# Patient Record
Sex: FEMALE | Race: WHITE
[De-identification: names, ages, dates, MRNs, and addresses within clinical notes are randomized per-mention and may not be internally consistent; named-entity substitution may affect disease eponyms.]

---

## 2020-05-21 ENCOUNTER — HOSPITAL ENCOUNTER (INPATIENT)
Dept: HOSPITAL 62 - ER | Age: 65
LOS: 4 days | Discharge: HOME | DRG: 871 | End: 2020-05-25
Attending: INTERNAL MEDICINE | Admitting: INTERNAL MEDICINE
Payer: COMMERCIAL

## 2020-05-21 DIAGNOSIS — R65.21: ICD-10-CM

## 2020-05-21 DIAGNOSIS — F31.9: ICD-10-CM

## 2020-05-21 DIAGNOSIS — R47.81: ICD-10-CM

## 2020-05-21 DIAGNOSIS — G93.41: ICD-10-CM

## 2020-05-21 DIAGNOSIS — N18.9: ICD-10-CM

## 2020-05-21 DIAGNOSIS — A41.51: Primary | ICD-10-CM

## 2020-05-21 DIAGNOSIS — Z88.6: ICD-10-CM

## 2020-05-21 DIAGNOSIS — Z03.818: ICD-10-CM
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
## 2020-05-21 DIAGNOSIS — J44.9: ICD-10-CM

## 2020-05-21 DIAGNOSIS — Z88.8: ICD-10-CM

## 2020-05-21 DIAGNOSIS — I12.9: ICD-10-CM

## 2020-05-21 DIAGNOSIS — N17.9: ICD-10-CM

## 2020-05-21 DIAGNOSIS — Z91.048: ICD-10-CM

## 2020-05-21 DIAGNOSIS — E11.40: ICD-10-CM

## 2020-05-21 DIAGNOSIS — E66.9: ICD-10-CM

## 2020-05-21 DIAGNOSIS — D72.810: ICD-10-CM

## 2020-05-21 DIAGNOSIS — Z79.899: ICD-10-CM
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
## 2020-05-21 DIAGNOSIS — N39.0: ICD-10-CM

## 2020-05-21 DIAGNOSIS — Z79.891: ICD-10-CM

## 2020-05-21 LAB
ABSOLUTE LYMPHOCYTES# (MANUAL): 1.6 10^3/UL (ref 0.5–4.7)
ABSOLUTE MONOCYTES # (MANUAL): 1.2 10^3/UL (ref 0.1–1.4)
ADD MANUAL DIFF: YES
ALBUMIN SERPL-MCNC: 4 G/DL (ref 3.5–5)
ALP SERPL-CCNC: 109 U/L (ref 38–126)
ANION GAP SERPL CALC-SCNC: 11 MMOL/L (ref 5–19)
APPEARANCE UR: (no result)
APTT PPP: YELLOW S
ARTERIAL BLOOD FIO2: (no result)
ARTERIAL BLOOD FIO2: (no result)
ARTERIAL BLOOD H2CO3: 1.14 MMOL/L (ref 1.05–1.35)
ARTERIAL BLOOD H2CO3: 1.47 MMOL/L (ref 1.05–1.35)
ARTERIAL BLOOD HCO3: 20.7 MMOL/L (ref 20–24)
ARTERIAL BLOOD HCO3: 25.4 MMOL/L (ref 20–24)
ARTERIAL BLOOD PCO2: 37.9 MMHG (ref 35–45)
ARTERIAL BLOOD PCO2: 48.9 MMHG (ref 35–45)
ARTERIAL BLOOD PH: 7.33 (ref 7.35–7.45)
ARTERIAL BLOOD PH: 7.36 (ref 7.35–7.45)
ARTERIAL BLOOD PO2: 18.7 MMHG (ref 80–100)
ARTERIAL BLOOD PO2: 64.3 MMHG (ref 80–100)
ARTERIAL BLOOD TOTAL CO2: 21.9 MMOL/L (ref 21–25)
ARTERIAL BLOOD TOTAL CO2: 26.9 MMOL/L (ref 21–25)
AST SERPL-CCNC: 149 U/L (ref 14–36)
BARBITURATES UR QL SCN: NEGATIVE
BASE EXCESS BLDA CALC-SCNC: -0.9 MMOL/L
BASE EXCESS BLDA CALC-SCNC: -4.3 MMOL/L
BASOPHILS NFR BLD MANUAL: 0 % (ref 0–2)
BILIRUB DIRECT SERPL-MCNC: 0.2 MG/DL (ref 0–0.4)
BILIRUB SERPL-MCNC: 0.8 MG/DL (ref 0.2–1.3)
BILIRUB UR QL STRIP: NEGATIVE
BUN SERPL-MCNC: 52 MG/DL (ref 7–20)
CALCIUM: 9.6 MG/DL (ref 8.4–10.2)
CHLORIDE SERPL-SCNC: 102 MMOL/L (ref 98–107)
CO2 SERPL-SCNC: 22 MMOL/L (ref 22–30)
EOSINOPHIL NFR BLD MANUAL: 0 % (ref 0–6)
ERYTHROCYTE [DISTWIDTH] IN BLOOD BY AUTOMATED COUNT: 13.6 % (ref 11.5–14)
ETHANOL SERPL-MCNC: < 10 MG/DL
GLUCOSE SERPL-MCNC: 160 MG/DL (ref 75–110)
GLUCOSE UR STRIP-MCNC: NEGATIVE MG/DL
HCT VFR BLD CALC: 37.9 % (ref 36–47)
HGB BLD-MCNC: 12.6 G/DL (ref 12–15.5)
KETONES UR STRIP-MCNC: NEGATIVE MG/DL
MACROCYTES BLD QL SMEAR: (no result)
MCH RBC QN AUTO: 33.4 PG (ref 27–33.4)
MCHC RBC AUTO-ENTMCNC: 33.4 G/DL (ref 32–36)
MCV RBC AUTO: 100 FL (ref 80–97)
METHADONE UR QL SCN: NEGATIVE
MONOCYTES % (MANUAL): 6 % (ref 3–13)
NEUTS BAND NFR BLD MANUAL: 5 % (ref 3–5)
NITRITE UR QL STRIP: POSITIVE
PCP UR QL SCN: NEGATIVE
PH UR STRIP: 5 [PH] (ref 5–9)
PLATELET # BLD: 123 10^3/UL (ref 150–450)
PLATELET COMMENT: (no result)
POTASSIUM SERPL-SCNC: 4.6 MMOL/L (ref 3.6–5)
PROT SERPL-MCNC: 6.4 G/DL (ref 6.3–8.2)
PROT UR STRIP-MCNC: 30 MG/DL
RBC # BLD AUTO: 3.78 10^6/UL (ref 3.72–5.28)
SAO2 % BLDA: 25.7 % (ref 94–98)
SAO2 % BLDA: 91.8 % (ref 94–98)
SEGMENTED NEUTROPHILS % (MAN): 81 % (ref 42–78)
SP GR UR STRIP: 1.02
TOTAL CELLS COUNTED BLD: 100
URINE AMPHETAMINES SCREEN: NEGATIVE
URINE BENZODIAZEPINES SCREEN: NEGATIVE
URINE COCAINE SCREEN: NEGATIVE
URINE MARIJUANA (THC) SCREEN: NEGATIVE
UROBILINOGEN UR-MCNC: NEGATIVE MG/DL (ref ?–2)
VARIANT LYMPHS NFR BLD MANUAL: 8 % (ref 13–45)
WBC # BLD AUTO: 19.5 10^3/UL (ref 4–10.5)

## 2020-05-21 PROCEDURE — 80202 ASSAY OF VANCOMYCIN: CPT

## 2020-05-21 PROCEDURE — 99285 EMERGENCY DEPT VISIT HI MDM: CPT

## 2020-05-21 PROCEDURE — 84145 PROCALCITONIN (PCT): CPT

## 2020-05-21 PROCEDURE — 87635 SARS-COV-2 COVID-19 AMP PRB: CPT

## 2020-05-21 PROCEDURE — 93005 ELECTROCARDIOGRAM TRACING: CPT

## 2020-05-21 PROCEDURE — 87077 CULTURE AEROBIC IDENTIFY: CPT

## 2020-05-21 PROCEDURE — 80053 COMPREHEN METABOLIC PANEL: CPT

## 2020-05-21 PROCEDURE — 84134 ASSAY OF PREALBUMIN: CPT

## 2020-05-21 PROCEDURE — 81001 URINALYSIS AUTO W/SCOPE: CPT

## 2020-05-21 PROCEDURE — 87086 URINE CULTURE/COLONY COUNT: CPT

## 2020-05-21 PROCEDURE — C9113 INJ PANTOPRAZOLE SODIUM, VIA: HCPCS

## 2020-05-21 PROCEDURE — 99291 CRITICAL CARE FIRST HOUR: CPT

## 2020-05-21 PROCEDURE — 82803 BLOOD GASES ANY COMBINATION: CPT

## 2020-05-21 PROCEDURE — 85025 COMPLETE CBC W/AUTO DIFF WBC: CPT

## 2020-05-21 PROCEDURE — 83605 ASSAY OF LACTIC ACID: CPT

## 2020-05-21 PROCEDURE — 80164 ASSAY DIPROPYLACETIC ACD TOT: CPT

## 2020-05-21 PROCEDURE — 84100 ASSAY OF PHOSPHORUS: CPT

## 2020-05-21 PROCEDURE — 70450 CT HEAD/BRAIN W/O DYE: CPT

## 2020-05-21 PROCEDURE — 87088 URINE BACTERIA CULTURE: CPT

## 2020-05-21 PROCEDURE — 96361 HYDRATE IV INFUSION ADD-ON: CPT

## 2020-05-21 PROCEDURE — 87186 SC STD MICRODIL/AGAR DIL: CPT

## 2020-05-21 PROCEDURE — 96368 THER/DIAG CONCURRENT INF: CPT

## 2020-05-21 PROCEDURE — 83735 ASSAY OF MAGNESIUM: CPT

## 2020-05-21 PROCEDURE — 71045 X-RAY EXAM CHEST 1 VIEW: CPT

## 2020-05-21 PROCEDURE — 72125 CT NECK SPINE W/O DYE: CPT

## 2020-05-21 PROCEDURE — 80048 BASIC METABOLIC PNL TOTAL CA: CPT

## 2020-05-21 PROCEDURE — 80307 DRUG TEST PRSMV CHEM ANLYZR: CPT

## 2020-05-21 PROCEDURE — 36415 COLL VENOUS BLD VENIPUNCTURE: CPT

## 2020-05-21 PROCEDURE — 93010 ELECTROCARDIOGRAM REPORT: CPT

## 2020-05-21 PROCEDURE — 82565 ASSAY OF CREATININE: CPT

## 2020-05-21 PROCEDURE — 96365 THER/PROPH/DIAG IV INF INIT: CPT

## 2020-05-21 PROCEDURE — 83036 HEMOGLOBIN GLYCOSYLATED A1C: CPT

## 2020-05-21 PROCEDURE — 82550 ASSAY OF CK (CPK): CPT

## 2020-05-21 PROCEDURE — 87040 BLOOD CULTURE FOR BACTERIA: CPT

## 2020-05-21 PROCEDURE — 82962 GLUCOSE BLOOD TEST: CPT

## 2020-05-21 PROCEDURE — 84484 ASSAY OF TROPONIN QUANT: CPT

## 2020-05-21 PROCEDURE — 73522 X-RAY EXAM HIPS BI 3-4 VIEWS: CPT

## 2020-05-21 PROCEDURE — 82140 ASSAY OF AMMONIA: CPT

## 2020-05-21 RX ADMIN — SODIUM CHLORIDE PRN MLS/HR: 9 INJECTION, SOLUTION INTRAVENOUS at 22:26

## 2020-05-21 RX ADMIN — PANTOPRAZOLE SODIUM SCH MG: 40 INJECTION, POWDER, FOR SOLUTION INTRAVENOUS at 10:55

## 2020-05-21 RX ADMIN — PIPERACILLIN AND TAZOBACTAM SCH MLS/HR: 3; .375 INJECTION, POWDER, LYOPHILIZED, FOR SOLUTION INTRAVENOUS; PARENTERAL at 13:06

## 2020-05-21 RX ADMIN — INSULIN HUMAN SCH: 100 INJECTION, SOLUTION PARENTERAL at 17:28

## 2020-05-21 RX ADMIN — PIPERACILLIN AND TAZOBACTAM SCH MLS/HR: 3; .375 INJECTION, POWDER, LYOPHILIZED, FOR SOLUTION INTRAVENOUS; PARENTERAL at 23:22

## 2020-05-21 RX ADMIN — HEPARIN SODIUM SCH: 5000 INJECTION, SOLUTION INTRAVENOUS; SUBCUTANEOUS at 15:01

## 2020-05-21 RX ADMIN — Medication SCH UNIT: at 21:09

## 2020-05-21 RX ADMIN — HEPARIN SODIUM SCH UNIT: 5000 INJECTION, SOLUTION INTRAVENOUS; SUBCUTANEOUS at 21:10

## 2020-05-21 RX ADMIN — SODIUM CHLORIDE PRN MLS/HR: 9 INJECTION, SOLUTION INTRAVENOUS at 17:29

## 2020-05-21 RX ADMIN — INSULIN HUMAN SCH: 100 INJECTION, SOLUTION PARENTERAL at 23:16

## 2020-05-21 RX ADMIN — Medication SCH: at 13:06

## 2020-05-21 RX ADMIN — INSULIN HUMAN SCH: 100 INJECTION, SOLUTION PARENTERAL at 12:46

## 2020-05-21 RX ADMIN — PIPERACILLIN AND TAZOBACTAM SCH MLS/HR: 3; .375 INJECTION, POWDER, LYOPHILIZED, FOR SOLUTION INTRAVENOUS; PARENTERAL at 17:29

## 2020-05-21 RX ADMIN — Medication SCH: at 13:07

## 2020-05-21 RX ADMIN — Medication SCH: at 21:10

## 2020-05-21 NOTE — RADIOLOGY REPORT (SQ)
CT head without contrast on 5/21/2020 4:21 AM 



CLINICAL INDICATION: Fall, head injury, altered mental status



TECHNIQUE: Multiple axial images are obtained throughout the head

without the administration of contrast. This exam was performed

according to our departmental dose-optimization program, which

includes automated exposure control, adjustment of the mA and/or

kV according to patient size and/or use of iterative

reconstruction technique.

Total DLP is 1028.78 mGy*cm.



COMPARISON: None



FINDINGS: There is mild generalized cerebral atrophy.

Ventriculomegaly is not definitely out of proportion to the

degree of atrophy. There is no CT evidence of acute infarct.

There is no hemorrhage. There are no abnormal extra-axial fluid

collections. There is no mass, mass effect or midline shift. No

bony abnormality is noted.



IMPRESSION: No acute intracranial abnormality.

## 2020-05-21 NOTE — CRITICAL CARE ADMISSION REPORT
HPI


Date:: 05/21/20


Time:: 09:01


Reason for ICU Reason:: septic shock


HPI: 


This 65-year-old obese  female presented [facility] 

emergency department with a chief complaint of altered mental status, waxing and

waning mentation.  The patient's  reported that he woke up this morning 

to find the patient lying on the floor and called EMS.  EMS reported that her 

initial stroke screen was negative and even demonstrated improvement in 

mentation with normal conversation; however, she rapidly became confused, 

sedated and started slurring her speech and even appeared to have episodes of 

apnea.  Bag mask ventilatory support was provided along with placement of a 

nasopharyngeal airway.  En route, she demonstrated spontaneous respirations and 

no longer required ventilatory support.  In the emergency department, the 

patient again demonstrated waxing and waning mental status.  She was confused 

and thought she was at [facility].  While in the emergency department, she

did demonstrate hypotension, prompting initiation of norepinephrine infusion.  

Initial imaging and laboratory testing failed to reveal an obvious etiology.





At the time of clinical interview, the patient is quite somnolent.  She is 

arousable to noxious stimuli.  It is noteworthy that her level of orientation 

seems to rapidly wax and wane during the course of a single conversation.  At 

times, she is oriented to time and person and is even able to provide quite a 

bit of her own medical history.  Then, she deteriorates to becoming difficult to

arouse.  She does protect her airway.  Notably, her urine drug screen was 

negative.


History obtained from:: ER staff; patient; review of record





- Diagnosis/Plan


(1) Septic shock


Is this a current diagnosis for this admission?: Yes   


Plan: 


Initial IV fluid bolus completed in ER.


Titrate norepinephrine to maintain MAP 65.


Vasopressin, as needed.


Empiric Zosyn/vancomycin.


Although the patient has a normal chest x-ray, the patient presents with 

lymphocytopenia, high normal potassium and abnormal LFTs.  SARS-2-CoV testing 

should be performed.








(2) Urinary tract infection


Qualifiers: 


   Hematuria presence: without hematuria 


Is this a current diagnosis for this admission?: Yes   





(3) Renal failure


Qualifiers: 


   Renal failure chronicity: unspecified chronicity   Qualified Code(s): N19 - 

Unspecified kidney failure   


Is this a current diagnosis for this admission?: Yes   


Plan: 


Repeat BMP








(4) Abnormal LFTs


Is this a current diagnosis for this admission?: Yes   





(5) Lymphocytopenia


Is this a current diagnosis for this admission?: Yes   





(6) Type 2 diabetes mellitus with diabetic neuropathy


Is this a current diagnosis for this admission?: Yes   


Plan: 


Accu-Cheks every 6 hours.


Sliding scale insulin coverage.








(7) Altered mental status


Qualifiers: 


   Altered mental status type: unspecified   Qualified Code(s): R41.82 - Altered

mental status, unspecified   


Is this a current diagnosis for this admission?: Yes   


Plan: 


Unclear etiology.  Hospital toxic encephalopathy secondary to urinary tract 

infection.  Also, the patient is noted to be on valproic acid.








(8) COVID-19 ruled out by laboratory testing


Is this a current diagnosis for this admission?: Yes   





Past Medical History


Cardiac Medical History: Reports: Hypertension


Endocrine Medical History: Reports: Diabetes Mellitus Type 2


Psychiatric Medical History: Reports: Bipolar Disorder





Social/Family History





- Social History


Lives with: Family, Spouse/Significant other


Smoking Status: Unknown if Ever Smoked





- Medication/Allergies


Home Medications: 








Albuterol Sulfate [Proair Hfa Inhalation Aerosol 8.5 gm Mdi] 2 puff IH Q4HP PRN 

05/21/20 


Amlodipine Besylate [Norvasc 2.5 mg Tablet] 2.5 mg PO DAILY 05/21/20 


Atenolol [Tenormin 50 mg Tablet] 50 mg PO BID 05/21/20 


Atorvastatin Calcium [Lipitor 20 mg Tablet] 20 mg PO QHS 05/21/20 


Divalproex Sodium [Depakote  mg Tab.sr] 500 mg PO Q12 05/21/20 


Gabapentin [Neurontin] 600 mg PO TID 05/21/20 


Magnesium Oxide [Mag-Ox 400 mg Tablet] 400 mg PO DAILY 05/21/20 


Meloxicam [Mobic] 15 mg PO DAILY 05/21/20 


Oxycodone HCl/Acetaminophen [Percocet 5-325 mg Tablet] 1 tab PO Q8HP PRN 

05/21/20 


Solifenacin Succinate 10 mg PO DAILY 05/21/20 








Allergies/Adverse Reactions: 


                                        





morphine Allergy (Verified 05/21/20 06:25)


   


paroxetine [From Paxil] Allergy (Verified 05/21/20 06:25)


   


SURGICAL TAPE Allergy (Unknown, Uncoded 05/21/20 09:11)


   BLISTERING OF SKIN











Review of Systems


Constitutional: ABSENT: anorexia, chills, fatigue, fever(s), headache(s), night 

sweats, weakness, weight gain, weight loss, other


Nose, Mouth, and Throat: PRESENT: other - Dry mouth


Cardiovascular: ABSENT: chest pain, dyspnea on exertion, edema, orthropnea, 

palpitations, other


Respiratory: ABSENT: cough, dyspnea, hemoptysis, sputum, other


Gastrointestinal: PRESENT: as per HPI.  ABSENT: abdominal pain, heartburn, 

nausea, vomiting


Genitourinary: ABSENT: difficulty urinating, dysuria, nocturia


Neurological: PRESENT: abnormal speech.  ABSENT: frequent falls


Psychiatric: PRESENT: other - Bipolar disorder





Physical Exam


Vital Signs: 


                                        











Temp Pulse Resp BP Pulse Ox


 


 98.3 F      14   126/68 H  96 


 


 05/21/20 04:20     05/21/20 08:36  05/21/20 08:36  05/21/20 08:36








                                 Intake & Output











 05/20/20 05/21/20 05/22/20





 06:59 06:59 06:59


 


Intake Total  2000 1003


 


Balance  2000 1003


 


Weight  96.9 kg 








                                  Weight/Height





Weight                           96.9 kg


Height                           1.7 m








General appearance: PRESENT: no acute distress, obese, well-developed, well-

nourished, other - Appears older than stated age


Head exam: PRESENT: atraumatic, normocephalic


Eye exam: PRESENT: conjunctiva pink, EOMI, PERRLA.  ABSENT: scleral icterus


Mouth exam: PRESENT: dry mucosa, tongue midline


Neck exam: ABSENT: carotid bruit, JVD, lymphadenopathy, thyromegaly


Respiratory exam: PRESENT: clear to auscultation joey.  ABSENT: rales, rhonchi, 

wheezes


Cardiovascular exam: PRESENT: RRR.  ABSENT: diastolic murmur, rubs, systolic 

murmur


GI/Abdominal exam: PRESENT: normal bowel sounds, soft.  ABSENT: distended, 

guarding, mass, organolmegaly, rebound, tenderness


Extremities exam: ABSENT: calf tenderness, clubbing, pedal edema, tenderness


Musculoskeletal exam: PRESENT: normal inspection.  ABSENT: deformity


Neurological exam: PRESENT: altered, oriented to person, oriented to situation, 

CN II-XII grossly intact, other - Appears to be searching for words.  Knows that

it is 2020.  Knows she is in [place].  No nuchal rigidity. Equivocal 

right Babinski.  DTRs: 2+ at bilateral biceps; 2+ bilateral patellar.  Decreased

light touch sensation on the right side.  Pain sensation intact.  Bilateral 

lower extremity paresthesias..  ABSENT: oriented to place, motor sensory deficit


Focused psych exam: ABSENT: pressured speech, psychomotor agitation, 

restlessness


Skin exam: PRESENT: dry, intact, warm.  ABSENT: cyanosis, rash


Tubes/Lines: PRESENT: Central Line - Right IJ





Laboratory/Radiographs


Laboratory Results: 


                                        





                                 05/21/20 04:45 





                                 05/21/20 04:45 





                                        











  05/21/20 05/21/20 05/21/20





  04:45 04:45 04:45


 


WBC  19.5 H  


 


RBC  3.78  


 


Hgb  12.6  


 


Hct  37.9  


 


MCV  100 H  


 


MCH  33.4  


 


MCHC  33.4  


 


RDW  13.6  


 


Plt Count  123 L  


 


Seg Neutrophils %  Not Reportable  


 


Carbonic Acid    1.47 H


 


HCO3/H2CO3 Ratio    17:1


 


ABG pH    7.33 L


 


ABG pCO2    48.9 H


 


ABG pO2    18.7 L*


 


ABG HCO3    25.4 H


 


ABG O2 Saturation    25.7 L


 


ABG Base Excess    -0.9


 


FiO2    ROOM AIR


 


Sodium   135.4 L 


 


Potassium   4.6 


 


Chloride   102 


 


Carbon Dioxide   22 


 


Anion Gap   11 


 


BUN   52 H 


 


Creatinine   1.74 H 


 


Est GFR ( Amer)   35 L 


 


Glucose   160 H 


 


Lactic Acid   


 


Calcium   9.6 


 


Total Bilirubin   0.8 


 


AST   149 H 


 


Alkaline Phosphatase   109 


 


Total Protein   6.4 


 


Albumin   4.0 


 


Urine Color   


 


Urine Appearance   


 


Urine pH   


 


Ur Specific Gravity   


 


Urine Protein   


 


Urine Glucose (UA)   


 


Urine Ketones   


 


Urine Blood   


 


Urine Nitrite   


 


Ur Leukocyte Esterase   


 


Urine WBC (Auto)   


 


Urine RBC (Auto)   














  05/21/20 05/21/20 05/21/20





  05:17 05:35 06:00


 


WBC   


 


RBC   


 


Hgb   


 


Hct   


 


MCV   


 


MCH   


 


MCHC   


 


RDW   


 


Plt Count   


 


Seg Neutrophils %   


 


Carbonic Acid  1.14  


 


HCO3/H2CO3 Ratio  18:1  


 


ABG pH  7.36  


 


ABG pCO2  37.9  


 


ABG pO2  64.3 L  


 


ABG HCO3  20.7  


 


ABG O2 Saturation  91.8 L  


 


ABG Base Excess  -4.3  


 


FiO2  ROOM AIR  


 


Sodium   


 


Potassium   


 


Chloride   


 


Carbon Dioxide   


 


Anion Gap   


 


BUN   


 


Creatinine   


 


Est GFR (African Amer)   


 


Glucose   


 


Lactic Acid   2.8 H 


 


Calcium   


 


Total Bilirubin   


 


AST   


 


Alkaline Phosphatase   


 


Total Protein   


 


Albumin   


 


Urine Color    YELLOW


 


Urine Appearance    CLOUDY


 


Urine pH    5.0


 


Ur Specific Gravity    1.018


 


Urine Protein    30 H


 


Urine Glucose (UA)    NEGATIVE


 


Urine Ketones    NEGATIVE


 


Urine Blood    NEGATIVE


 


Urine Nitrite    POSITIVE H


 


Ur Leukocyte Esterase    SMALL H


 


Urine WBC (Auto)    7


 


Urine RBC (Auto)    2














  05/21/20





  08:18


 


WBC 


 


RBC 


 


Hgb 


 


Hct 


 


MCV 


 


MCH 


 


MCHC 


 


RDW 


 


Plt Count 


 


Seg Neutrophils % 


 


Carbonic Acid 


 


HCO3/H2CO3 Ratio 


 


ABG pH 


 


ABG pCO2 


 


ABG pO2 


 


ABG HCO3 


 


ABG O2 Saturation 


 


ABG Base Excess 


 


FiO2 


 


Sodium 


 


Potassium 


 


Chloride 


 


Carbon Dioxide 


 


Anion Gap 


 


BUN 


 


Creatinine 


 


Est GFR (African Amer) 


 


Glucose 


 


Lactic Acid  2.0


 


Calcium 


 


Total Bilirubin 


 


AST 


 


Alkaline Phosphatase 


 


Total Protein 


 


Albumin 


 


Urine Color 


 


Urine Appearance 


 


Urine pH 


 


Ur Specific Gravity 


 


Urine Protein 


 


Urine Glucose (UA) 


 


Urine Ketones 


 


Urine Blood 


 


Urine Nitrite 


 


Ur Leukocyte Esterase 


 


Urine WBC (Auto) 


 


Urine RBC (Auto) 








                                        











  05/21/20 05/21/20





  04:45 04:45


 


Creatine Kinase   755 H


 


Troponin I  < 0.012 











Impressions: 


                                        





Head CT  05/21/20 00:00


IMPRESSION: No acute intracranial abnormality. 


 








Cervical Spine CT  05/21/20 04:18


IMPRESSION: Degenerative changes with no acute abnormality.


 








Hip X-Ray  05/21/20 04:18


IMPRESSION: Degenerative changes in the hips with no acute


abnormality.


 








Knee X-Ray  05/21/20 04:18


IMPRESSION:


1. Changes of osteoarthritis in the right knee with no acute bony


abnormality in either knee.


2. Very small joint effusion in the right knee, if clinically


indicated follow up MRI could better evaluate for internal


derangement of the joint.


 








Chest X-Ray  05/21/20 07:42


IMPRESSION:  CENTRAL LINE IN SATISFACTORY POSITION.  NO PNEUMOTHORAX.


 











All labs, radiographs, diagnostic studies and EKGs were personally reviewed: Yes


In addition, reports of radiographic and diagnostic studies were read: Yes





Critical Time


Critical Time (minutes): 60


-: 


The care of a critically ill patient is dynamic.  This note represents a static 

moment in the admission process. Orders and treatments may be given 

simultaneously and urgently, and time is not representative of the treatment 

process.





This patient requires Critical Care secondary to life threatening organ or limb 

dysfunction.  Without Critical Care services, the patient is at risk for 

increased mortality and morbidity.

## 2020-05-21 NOTE — RADIOLOGY REPORT (SQ)
Pelvis and bilateral hips total five view on 5/21/2020 at 5:25 AM



CLINICAL INDICATION: Bilateral hip pain after fall



COMPARISON: 10/23/2019



FINDINGS: Degenerative changes are noted in the lower lumbar

spine. Moderate changes of osteoarthritis are noted in the left

greater than right hip with joint space narrowing, sclerosis and

subchondral cyst formation in the acetabulum especially on the

left. The hips are well located. The SI joints are well aligned.

There are no fractures.



IMPRESSION: Degenerative changes in the hips with no acute

abnormality.

## 2020-05-21 NOTE — ER DOCUMENT REPORT
ED General





- General


Chief Complaint: Fall


Stated Complaint: FALL, ALTERED MENTAL STATUS


Time Seen by Provider: 05/21/20 04:17


Primary Care Provider: 


KAT VAZQUEZ MD [Primary Care Provider] - Follow up as needed


Notes: 





Patient is a 65-year-old female that comes to the emergency department for chief

complaint of altered mental status.  Reportedly  awoke and found patient 

lying on the floor and called EMS.  EMS states that they were called to the 

house, patient was initially stroke screen negative and responding to them 

normally however they state patient started becoming confused, sedated, started 

slurring her speech, and then started having what appeared to be apnea.  EMS 

states they bagged her, placed in NPA in route.  Close to arrival patient 

started to breathe spontaneously again and they no longer needed to perform 

bagging but patient was sedated.  On arrival to the emergency department patient

awakened, she is able to tell me that she believes she is at [facility] 

and that her  called the emergency department.  She also states that her 

knees hurt from falling. She denies any other complaints at this time including 

chest pain, abdominal pain, headache.  Past medical history includes type 2 

diabetes with neuropathy, chronic kidney disease, hypertension, COPD, and 

bipolar disorder.  She is on Depakote, atenolol, amlodipine, gabapentin and was 

previously on glimepiride and p.o. glitazone but this was reportedly stopped.  

She was also reportedly previously on clonazepam but this was also stopped.





- Related Data


Allergies/Adverse Reactions: 


                                        





morphine Allergy (Verified 05/21/20 06:25)


   


paroxetine [From Paxil] Allergy (Verified 05/21/20 06:25)


   











Past Medical History





- General


Information source: Patient, Emergency Med Personnel





- Social History


Smoking Status: Unknown if Ever Smoked


Drug Abuse: None


Lives with: Family


Family History: Reviewed & Not Pertinent





- Past Medical History


Cardiac Medical History: Reports: Hx Hypertension


Endocrine Medical History: Reports: Hx Diabetes Mellitus Type 2





Review of Systems





- Review of Systems


Constitutional: See HPI


EENT: No symptoms reported


Cardiovascular: See HPI


Respiratory: See HPI


Gastrointestinal: No symptoms reported


Genitourinary: No symptoms reported


Female Genitourinary: No symptoms reported


Musculoskeletal: No symptoms reported


Skin: No symptoms reported


Hematologic/Lymphatic: No symptoms reported


Neurological/Psychological: See HPI





Physical Exam





- Vital signs


Vitals: 


                                        











Temp Resp BP Pulse Ox


 


 98.3 F   18   136/84 H  95 


 


 05/21/20 04:17  05/21/20 04:17  05/21/20 04:17  05/21/20 04:17














- Notes


Notes: 





GENERAL: Drowsy but awakened, does not appear to be in distress


HEAD: Normocephalic, atraumatic.


EYES: Pupils equal, round, and reactive to light. Extraocular movements intact.


ENT: Oral mucosa.  Dry, tongue midline. Oropharynx unremarkable. Airway patent. 




NECK: Full range of motion. Supple. Trachea midline. No lymphadenopathy.


LUNGS: Clear to auscultation bilaterally, no wheezes, rales, or rhonchi. No 

respiratory distress. Non-tender chest wall. 


HEART: Regular rate and rhythm. No murmur


ABDOMEN: Soft, non-tender. Non-distended. Bowel sounds present in all 4 

quadrants.


GENITOURINARY: Deferred


EXTREMITIES: Abrasions to both knees, complains with palpation but this is mini

mal.  No new injury or swelling noted.  No open wounds.  Minimal tenderness over

bilateral hips.  Moves all 4 extremities spontaneously. No edema, normal radial 

and dorsalis pedis pulses bilaterally. No cyanosis.


BACK: no cervical, thoracic, lumbar midline tenderness. No saddle anesthesia, 

normal distal neurovascular exam. 


NEUROLOGICAL: Alert and oriented x3. Normal speech. Cranial nerves II through 

XII grossly intact. Strength 5/5 in upper extremities with bilateral equal 

weakness only able to barely lift the legs off the bed with both legs.


PSYCH: Normal affect, normal mood.


SKIN: Warm, dry, normal turgor. No rashes or lesions noted.





Course





- Re-evaluation


Re-evalutation: 


On initial arrival patient with NPA in place, however she awakened and began 

speaking to us, patient responded appropriately to directions and GCS greater 

than 8.  NP removed, patient was not intubated.  Vital signs are actually 

unremarkable at this time.  Because of reported symptoms patient was taken 

immediately to CAT scan when her blood pressure was normal and patient was 

awake.  Dr. booth did see the patient at bedside.





CT of the head pending results but does not appear to have any intracranial 

abnormality.  Chest x-ray is pending but does not appear abnormal either.  CBC 

shows leukocytosis at greater than 19,000 with elevation of neutrophils but no 

bandemia.  Patient started becoming hypotensive.  On reevaluation she is 

hypotensive but she is alert and conversational, she is fully oriented.  She is 

not febrile, she is not tachycardic.  Urine is pending.  We will place a Hendricks.





After 2 L IV fluid resuscitation blood pressure did respond and normalized.  

Based on her leukocytosis, slightly elevated lactic acid, hypotension, I am 

concerned she may be septic and patient has been given antibiotics.  I have 

discussed the patient with Dr. booth.  Troponin and EKG are both 

unremarkable.  CT and x-ray reports no acute findings.





05/21/20 06:50


Patient is starting to become hypotensive again after IV fluid resuscitation.  N

ow on reevaluation patient is only responding to sternal rub, previously she 

would awaken to verbal stimuli only and would converse without difficulty.  

After patient was awakened her blood pressure resumed to normal with normal map 

greater than 65.





05/21/20


Dr. Mora did come to bedside.  He does not recommend intubation because patient

is protecting her airway and is not hypoxic along with unremarkable ABG 

generally except for borderline hypoxia requiring nasal cannula.  He does agree 

with central line placement because of persistent hypotension however.  Right IJ

was placed without incident, chest x-ray confirmed.  During central line patient

moaned and aroused some and her blood pressure improved, however afterwards she 

became hypotensive with maps less than 65 again.  Starting on Levophed.





05/21/20 08:01


Discussed with intensivist Dr. Quevedo, he accepts patient to the ICU.





- Vital Signs


Vital signs: 


                                        











Temp Pulse Resp BP Pulse Ox


 


 98.3 F      21 H  127/90 H  93 


 


 05/21/20 04:20     05/21/20 07:30  05/21/20 07:30  05/21/20 07:16














- Laboratory


Result Diagrams: 


                                 05/21/20 04:45





                                 05/21/20 04:45


Laboratory results interpreted by me: 


                                        











  05/21/20 05/21/20 05/21/20





  04:45 04:45 04:45


 


WBC  19.5 H  


 


MCV  100 H  


 


Plt Count  123 L  


 


Seg Neuts % (Manual)  81 H  


 


Lymphocytes % (Manual)  8 L  


 


Abs Neuts (Manual)  16.8 H  


 


Carbonic Acid    1.47 H


 


ABG pH    7.33 L


 


ABG pCO2    48.9 H


 


ABG pO2    18.7 L*


 


ABG HCO3    25.4 H


 


ABG Total CO2    26.9 H


 


ABG O2 Saturation    25.7 L


 


Sodium   135.4 L 


 


BUN   52 H 


 


Creatinine   1.74 H 


 


Est GFR ( Amer)   35 L 


 


Est GFR (MDRD) Non-Af   29 L 


 


Glucose   160 H 


 


POC Glucose   


 


Lactic Acid   


 


AST   149 H 


 


ALT   101 H 


 


Creatine Kinase   


 


Urine Protein   


 


Urine Nitrite   


 


Ur Leukocyte Esterase   


 


Urine Ascorbic Acid   














  05/21/20 05/21/20 05/21/20





  04:45 05:17 05:35


 


WBC   


 


MCV   


 


Plt Count   


 


Seg Neuts % (Manual)   


 


Lymphocytes % (Manual)   


 


Abs Neuts (Manual)   


 


Carbonic Acid   


 


ABG pH   


 


ABG pCO2   


 


ABG pO2   64.3 L 


 


ABG HCO3   


 


ABG Total CO2   


 


ABG O2 Saturation   91.8 L 


 


Sodium   


 


BUN   


 


Creatinine   


 


Est GFR ( Amer)   


 


Est GFR (MDRD) Non-Af   


 


Glucose   


 


POC Glucose   


 


Lactic Acid    2.8 H


 


AST   


 


ALT   


 


Creatine Kinase  755 H  


 


Urine Protein   


 


Urine Nitrite   


 


Ur Leukocyte Esterase   


 


Urine Ascorbic Acid   














  05/21/20 05/21/20





  06:00 07:04


 


WBC  


 


MCV  


 


Plt Count  


 


Seg Neuts % (Manual)  


 


Lymphocytes % (Manual)  


 


Abs Neuts (Manual)  


 


Carbonic Acid  


 


ABG pH  


 


ABG pCO2  


 


ABG pO2  


 


ABG HCO3  


 


ABG Total CO2  


 


ABG O2 Saturation  


 


Sodium  


 


BUN  


 


Creatinine  


 


Est GFR ( Amer)  


 


Est GFR (MDRD) Non-Af  


 


Glucose  


 


POC Glucose   158 H


 


Lactic Acid  


 


AST  


 


ALT  


 


Creatine Kinase  


 


Urine Protein  30 H 


 


Urine Nitrite  POSITIVE H 


 


Ur Leukocyte Esterase  SMALL H 


 


Urine Ascorbic Acid  40 H 














Procedures





- Central Line


  ** right IJ


Time completed: 07:45


Consent obtained: No - patient unable to answer questions


Central line pre-insertion: Sterile PPE donned, Chloraprep applied, Sterile 

drapes applied


Central line lumen type: Triple


Anesthetic type: 1% Lidocaine


mL's of anesthesia: 4


Ultrasound guided: Yes


Line secured with sutures: Yes


Central line post-insertion: Blood return from lumens, Biopatch applied, 

Sutured, Sterile dressing applied, Position confirmed w/ CXR


Number of attempts: 1


Complications: No





Critical Care Note





- Critical Care Note


Total time excluding time spent on procedures (mins): 40 - Altered mental 

status, hypotensive shock


Comments: 





Please allow 40 minutes of critical care time for evaluation and management of 

patient with hypotensive shock and altered mental status.  Interventions 

requiring IV fluid resuscitation, broad-spectrum antibiotics, vasopressor.  Time

spent performing multiple re-evaluations.  Time spent admitting to the ICU.





Discharge





- Discharge


Clinical Impression: 


Hypotension


Qualifiers:


 Hypotension type: unspecified hypotension type Qualified Code(s): I95.9 - 

Hypotension, unspecified





Altered mental status


Qualifiers:


 Altered mental status type: unspecified Qualified Code(s): R41.82 - Altered 

mental status, unspecified





Leukocytosis


Qualifiers:


 Leukocytosis type: unspecified Qualified Code(s): D72.829 - Elevated white 

blood cell count, unspecified





Condition: Serious


Disposition: ADMITTED AS INPATIENT


Admitting Provider: Sae (Intensivist)


Unit Admitted: ICU


Referrals: 


KAT VAZQUEZ MD [Primary Care Provider] - Follow up as needed

## 2020-05-21 NOTE — RADIOLOGY REPORT (SQ)
Bilateral knees two view on 5/21/2020 at 5:30 AM



Clinical dictation: Bilateral knee pain after fall



COMPARISON: None



FINDINGS:



Right knee: There is mild joint space narrowing in all three

compartments with osteophyte formation consistent with changes of

osteoarthritis. A very small joint effusion is noted. There are

no fractures. Visualized joints are well aligned. There is

diffuse osteopenia.



Left knee: There is diffuse osteopenia. No joint effusion is

noted. There are no fractures. Visualized joints are well

aligned.



IMPRESSION:

1. Changes of osteoarthritis in the right knee with no acute bony

abnormality in either knee.

2. Very small joint effusion in the right knee, if clinically

indicated follow up MRI could better evaluate for internal

derangement of the joint.

## 2020-05-21 NOTE — RADIOLOGY REPORT (SQ)
CT cervical spine without contrast on 5/21/2020 at 4:23 AM



CLINICAL INDICATION: Fall, head injury, per protocol for

mechanism of injury



TECHNIQUE: Multiple axial images are obtained throughout the

cervical spine without the administration of contrast. Sagittal

and coronal reformatted images are also performed and reviewed.

This exam was performed according to our departmental

dose-optimization program, which includes automated exposure

control, adjustment of the mA and/or kV according to patient size

and/or use of iterative reconstruction technique.

Total DLP is 243.15 mGy*cm. 



COMPARISON: None 



FINDINGS: Diffuse degenerative disc disease is noted throughout

the cervical spine. Large anterior osteophyte formation is noted

from C2 through C4. There is partial fusion from C4 through C7.

Degenerative facet disease is noted worse on the right in the

upper cervical spine. Reformatted images reveal normal alignment

of the cervical spine. There is no prevertebral soft tissue

swelling. There are no acute fracture lines. No definite disc

herniation is noted.



IMPRESSION: Degenerative changes with no acute abnormality.

## 2020-05-21 NOTE — RADIOLOGY REPORT (SQ)
EXAM DESCRIPTION:  CHEST SINGLE VIEW



IMAGES COMPLETED DATE/TIME:  5/21/2020 7:53 am



REASON FOR STUDY:  central line placement confirmation



COMPARISON:  5/21/2020 at 0503 hours.



EXAM PARAMETERS:  NUMBER OF VIEWS: One view.

TECHNIQUE: Single frontal radiographic view of the chest acquired.

RADIATION DOSE: NA

LIMITATIONS: None.



FINDINGS:  LUNGS AND PLEURA: No opacities, masses or pneumothorax. No pleural effusion.

MEDIASTINUM AND HILAR STRUCTURES: No masses.  Contour normal.

HEART AND VASCULAR STRUCTURES: Heart normal in size.  Normal vasculature.

BONES: No acute findings.

HARDWARE: Central line on the right side, tip at the level of the superior vena cava.

OTHER: No other significant finding.



IMPRESSION:  CENTRAL LINE IN SATISFACTORY POSITION.  NO PNEUMOTHORAX.



TECHNICAL DOCUMENTATION:  JOB ID:  [id number]

 2011 Dr. TATTOFF- All Rights Reserved



Reading location - IP/workstation name: [email or web address]

## 2020-05-21 NOTE — RADIOLOGY REPORT (SQ)
CHEST 1 VIEW on 5/21/2020 at 5:24 AM 



CLINICAL INDICATION: Altered mental status



COMPARISON: 11/7/2017



FINDINGS: Borderline cardiomegaly is noted. There is mild

elevation of the right hemidiaphragm. The lungs are clear. Hilar

and mediastinal contours are within normal limits. Degenerative

changes are noted in the spine. Pulmonary vascularity is within

normal limits.



IMPRESSION: No acute disease.

## 2020-05-22 LAB
ADD MANUAL DIFF: NO
ANION GAP SERPL CALC-SCNC: 9 MMOL/L (ref 5–19)
BASOPHILS # BLD AUTO: 0 10^3/UL (ref 0–0.2)
BASOPHILS NFR BLD AUTO: 0.3 % (ref 0–2)
BUN SERPL-MCNC: 22 MG/DL (ref 7–20)
CALCIUM: 8.3 MG/DL (ref 8.4–10.2)
CHLORIDE SERPL-SCNC: 107 MMOL/L (ref 98–107)
CO2 SERPL-SCNC: 20 MMOL/L (ref 22–30)
EOSINOPHIL # BLD AUTO: 0 10^3/UL (ref 0–0.6)
EOSINOPHIL NFR BLD AUTO: 0.5 % (ref 0–6)
ERYTHROCYTE [DISTWIDTH] IN BLOOD BY AUTOMATED COUNT: 13.9 % (ref 11.5–14)
GLUCOSE SERPL-MCNC: 109 MG/DL (ref 75–110)
HCT VFR BLD CALC: 34.9 % (ref 36–47)
HGB BLD-MCNC: 12.2 G/DL (ref 12–15.5)
LYMPHOCYTES # BLD AUTO: 0.7 10^3/UL (ref 0.5–4.7)
LYMPHOCYTES NFR BLD AUTO: 6.9 % (ref 13–45)
MCH RBC QN AUTO: 34.3 PG (ref 27–33.4)
MCHC RBC AUTO-ENTMCNC: 34.9 G/DL (ref 32–36)
MCV RBC AUTO: 98 FL (ref 80–97)
MONOCYTES # BLD AUTO: 1 10^3/UL (ref 0.1–1.4)
MONOCYTES NFR BLD AUTO: 10 % (ref 3–13)
NEUTROPHILS # BLD AUTO: 8.3 10^3/UL (ref 1.7–8.2)
NEUTS SEG NFR BLD AUTO: 82.3 % (ref 42–78)
PHOSPHATE SERPL-MCNC: 2.7 MG/DL (ref 2.5–4.5)
PLATELET # BLD: 96 10^3/UL (ref 150–450)
POTASSIUM SERPL-SCNC: 3.5 MMOL/L (ref 3.6–5)
PREALB SERPL-MCNC: 19.5 MG/DL (ref 17.6–36)
RBC # BLD AUTO: 3.56 10^6/UL (ref 3.72–5.28)
TOTAL CELLS COUNTED % (AUTO): 100 %
WBC # BLD AUTO: 10.1 10^3/UL (ref 4–10.5)

## 2020-05-22 RX ADMIN — INSULIN HUMAN SCH UNIT: 100 INJECTION, SOLUTION PARENTERAL at 17:23

## 2020-05-22 RX ADMIN — MAGNESIUM SULFATE IN DEXTROSE SCH MLS/HR: 10 INJECTION, SOLUTION INTRAVENOUS at 13:41

## 2020-05-22 RX ADMIN — INSULIN HUMAN SCH: 100 INJECTION, SOLUTION PARENTERAL at 06:16

## 2020-05-22 RX ADMIN — PIPERACILLIN AND TAZOBACTAM SCH MLS/HR: 3; .375 INJECTION, POWDER, LYOPHILIZED, FOR SOLUTION INTRAVENOUS; PARENTERAL at 17:23

## 2020-05-22 RX ADMIN — PIPERACILLIN AND TAZOBACTAM SCH MLS/HR: 3; .375 INJECTION, POWDER, LYOPHILIZED, FOR SOLUTION INTRAVENOUS; PARENTERAL at 06:35

## 2020-05-22 RX ADMIN — Medication SCH ML: at 13:40

## 2020-05-22 RX ADMIN — PIPERACILLIN AND TAZOBACTAM SCH MLS/HR: 3; .375 INJECTION, POWDER, LYOPHILIZED, FOR SOLUTION INTRAVENOUS; PARENTERAL at 12:20

## 2020-05-22 RX ADMIN — Medication SCH: at 06:35

## 2020-05-22 RX ADMIN — Medication SCH UNIT: at 13:40

## 2020-05-22 RX ADMIN — VANCOMYCIN HYDROCHLORIDE SCH MLS/HR: 1 INJECTION, POWDER, LYOPHILIZED, FOR SOLUTION INTRAVENOUS at 07:41

## 2020-05-22 RX ADMIN — INSULIN HUMAN SCH: 100 INJECTION, SOLUTION PARENTERAL at 12:15

## 2020-05-22 RX ADMIN — HEPARIN SODIUM SCH: 5000 INJECTION, SOLUTION INTRAVENOUS; SUBCUTANEOUS at 13:42

## 2020-05-22 RX ADMIN — MAGNESIUM SULFATE IN DEXTROSE SCH MLS/HR: 10 INJECTION, SOLUTION INTRAVENOUS at 12:22

## 2020-05-22 RX ADMIN — POTASSIUM CHLORIDE SCH MLS/HR: 29.8 INJECTION, SOLUTION INTRAVENOUS at 12:21

## 2020-05-22 RX ADMIN — Medication SCH ML: at 21:39

## 2020-05-22 RX ADMIN — SODIUM CHLORIDE PRN MLS/HR: 9 INJECTION, SOLUTION INTRAVENOUS at 03:40

## 2020-05-22 RX ADMIN — Medication SCH: at 06:17

## 2020-05-22 RX ADMIN — Medication SCH UNIT: at 21:43

## 2020-05-22 RX ADMIN — POTASSIUM CHLORIDE SCH MLS/HR: 29.8 INJECTION, SOLUTION INTRAVENOUS at 14:09

## 2020-05-22 RX ADMIN — PANTOPRAZOLE SODIUM SCH MG: 40 INJECTION, POWDER, FOR SOLUTION INTRAVENOUS at 09:31

## 2020-05-22 RX ADMIN — HEPARIN SODIUM SCH: 5000 INJECTION, SOLUTION INTRAVENOUS; SUBCUTANEOUS at 21:39

## 2020-05-22 RX ADMIN — HEPARIN SODIUM SCH: 5000 INJECTION, SOLUTION INTRAVENOUS; SUBCUTANEOUS at 06:35

## 2020-05-22 NOTE — PDOC CRITICAL CARE PROG REPORT
General


Date:: 05/22/20


ICU Day:: 2


Hospital Day:: 2


Resuscitation Status: Full Code


Events in the past 12 to 24 Hours:: 


5/21: Admitted with septic shock secondary to urinary tract infection.  

Presented [facility] emergency department with waxing and waning mental status.  In 

the emergency department, she became hypotensive and started on norepinephrine 

infusion.  She was afebrile and initially hemodynamically stable.  Laboratory 

evaluation did reveal leukocytosis and a dirty urine but was otherwise not 

compelling for another site of infection.  However, he became hypotensive in the

emergency department.  She was started on norepinephrine.  She did respond 

favorably to IV fluid administration, after which she developed fever and 

chills.





5/22: WBC 19.5>10.1.  Off Levophed.  Mentating well.  Today, the patient 

endorses that she has been experiencing dysuria.


Review of systems relevant to events:: 


Cardiovascular, genitourinary, neurologic


Reason for ICU Addmission:: septic shock





- Medications:


Medications reviewed and adjusted accordingly: Yes


Vasopressors:: 


Off Levophed





Physical Exam


Vital Signs: 


                                        











Temp Pulse Resp BP Pulse Ox


 


 99 F   86   21 H  142/64 H  98 


 


 05/22/20 10:00  05/21/20 20:25  05/21/20 18:00  05/21/20 18:00  05/22/20 00:00








                                 Intake & Output











 05/21/20 05/22/20 05/23/20





 06:59 06:59 06:59


 


Intake Total 2000 3360 


 


Output Total  3750 700


 


Balance 2000 -390 -700


 


Weight 96.9 kg 98 kg 








                                  Weight/Height





Weight                           98 kg


Height                           1.7 m








General appearance: PRESENT: no acute distress, obese, well-developed, well-

nourished


Head exam: PRESENT: atraumatic, normocephalic


Eye exam: PRESENT: conjunctiva pink, EOMI, PERRLA.  ABSENT: scleral icterus


Ear exam: PRESENT: normal external ear exam


Mouth exam: PRESENT: moist, tongue midline


Neck exam: ABSENT: carotid bruit, JVD, lymphadenopathy, thyromegaly


Respiratory exam: PRESENT: clear to auscultation joey.  ABSENT: rales, rhonchi, 

wheezes


Cardiovascular exam: PRESENT: RRR.  ABSENT: diastolic murmur, rubs, systolic 

murmur


Pulses: PRESENT: normal dorsalis pedis pul


GI/Abdominal exam: PRESENT: normal bowel sounds, soft.  ABSENT: distended, 

guarding, mass, organolmegaly, rebound, tenderness


Extremities exam: PRESENT: full ROM.  ABSENT: calf tenderness, clubbing, pedal 

edema


Musculoskeletal exam: PRESENT: normal inspection.  ABSENT: deformity


Neurological exam: PRESENT: alert, awake, oriented to person, oriented to place,

oriented to time, oriented to situation, CN II-XII grossly intact.  ABSENT: 

motor sensory deficit


Tubes/Lines: PRESENT: Central Line - Right IJ





Laboratory/Radiographs


Laboratory Results: 


                                        





                                 05/22/20 05:30 





                                 05/22/20 05:30 





                                        











  05/21/20 05/22/20 05/22/20





  17:49 05:30 05:30


 


WBC    10.1


 


RBC    3.56 L


 


Hgb    12.2


 


Hct    34.9 L


 


MCV    98 H


 


MCH    34.3 H


 


MCHC    34.9


 


RDW    13.9


 


Plt Count    96 L


 


Seg Neutrophils %    82.3 H


 


Sodium   136.3 L 


 


Potassium   3.5 L 


 


Chloride   107 


 


Carbon Dioxide   20 L 


 


Anion Gap   9 


 


BUN   22 H 


 


Creatinine   0.80 


 


Est GFR ( Amer)   > 60 


 


Glucose   109 


 


Lactic Acid  0.6 L  


 


Calcium   8.3 L 


 


Phosphorus   2.7 


 


Magnesium   1.7 


 


Prealbumin   19.5 








                                        











  05/21/20 05/21/20





  04:45 04:45


 


Creatine Kinase   755 H


 


Troponin I  < 0.012 











Impressions: 


                                        





Head CT  05/21/20 00:00


IMPRESSION: No acute intracranial abnormality. 


 








Cervical Spine CT  05/21/20 04:18


IMPRESSION: Degenerative changes with no acute abnormality.


 








Hip X-Ray  05/21/20 04:18


IMPRESSION: Degenerative changes in the hips with no acute


abnormality.


 








Knee X-Ray  05/21/20 04:18


IMPRESSION:


1. Changes of osteoarthritis in the right knee with no acute bony


abnormality in either knee.


2. Very small joint effusion in the right knee, if clinically


indicated follow up MRI could better evaluate for internal


derangement of the joint.


 








Chest X-Ray  05/21/20 07:42


IMPRESSION:  CENTRAL LINE IN SATISFACTORY POSITION.  NO PNEUMOTHORAX.


 











All labs, radiographs, diagnostic studies and EKGs were personally reviewed: Yes


In addition, reports of radiographic and diagnostic studies were read: Yes





Assessment and Plan





- Diagnosis


(1) Septic shock


Is this a current diagnosis for this admission?: Yes   


Plan: 


Resolved








(2) Urinary tract infection


Qualifiers: 


   Hematuria presence: without hematuria 


Is this a current diagnosis for this admission?: Yes   


Plan: 


Continue Zosyn/vancomycin.


Unfortunately, urine culture was not performed despite a dirty urinalysis.  

Blood cultures have revealed confusing results with 1 bottle showing gram-

negative rods in 1 bottle showing gram-positive cocci (perhaps both 

contaminants).  Consequently, I would advise against changing antibiotic 

coverage at this time.











(3) Renal failure


Qualifiers: 


   Renal failure chronicity: acute   Acute renal failure type: unspecified   

Qualified Code(s): N17.9 - Acute kidney failure, unspecified   


Is this a current diagnosis for this admission?: Yes   


Plan: 


Acute renal failure, resolved.


Replete potassium.


Repeat magnesium.








(4) Abnormal LFTs


Is this a current diagnosis for this admission?: Yes   


Plan: 


Repeat LFTs in a.m.








(5) Type 2 diabetes mellitus with diabetic neuropathy


Qualifiers: 


   Diabetes mellitus long term insulin use: unspecified long term insulin use 

status   Qualified Code(s): E11.40 - Type 2 diabetes mellitus with diabetic 

neuropathy, unspecified   


Is this a current diagnosis for this admission?: Yes   


Plan: 


Home medications list does not include insulin.  In fact, the patient reports 

that she does not take any diabetes medications (PCP discontinued them all).


Check hemoglobin A1c.











(6) Lymphocytopenia


Is this a current diagnosis for this admission?: Yes   





(7) Altered mental status


Qualifiers: 


   Altered mental status type: unspecified   Qualified Code(s): R41.82 - Altered

mental status, unspecified   


Is this a current diagnosis for this admission?: Yes   


Plan: 


Resolved








(8) COVID-19 ruled out by laboratory testing


Is this a current diagnosis for this admission?: Yes   


Plan Summary: 


Okay to transfer to the floor.





Critical Time


Critical Time (minutes): 45


Level of Care: ICU


-: 


1.  The care of a critical patient is a dynamic process.  This note is a 

representative synopsis but static in nature.  The timeframe for treatments 

given in order is not necessarily the actual time these treatments may have been

done.





2.  This patient requires critical care secondary to ongoing requirements for 

therapy not offered or safe outside the critical care environment.  Transfer to 

a lower level of care will result in altered life or limb morbidity and 

mortality.





3.  Multidisciplinary rounds completed.





4.  ABCDE bundle addressed.

## 2020-05-22 NOTE — CDI QUERY
CDI Query


CDI Review: 





Dear Provider:


 To better reflect your patients severity of illness, morbidity, and resource 


utilization    


 Please specify and document in the Progress Notes and Discharge Summary if you 


are monitoring / treating / evaluating any of the following conditions:











                                             Query                              

    Clinical indicators                       


 


Please clarify and document if the renal failure can be further specified





   Acute renal failure 2/2 to ATN





   Acute renal failure





   CKD (please stage)





   Unable to determine





   Other











  Per Critical Care Admission Note:


While in the emergency department, she did demonstrate hypotension, prompting 

initiation of norepinephrine infusion.





Septic shock


Is this a current diagnosis for this admission?: Yes   


Plan: 


Initial IV fluid bolus completed in ER.


Titrate norepinephrine to maintain MAP 65.


Vasopressin, as needed.





Renal failure


Qualifiers: 


   Renal failure chronicity: unspecified chronicity   Qualified Code(s): N19 - 

Unspecified kidney failure   





BUN / Cr: 


 52 / 1.74








  The terms probable, suspected, likely, possible or still to be ruled 

out may be used if you are unable to determine the exact nature of a condition.


Thank you for your consideration,


                    Clinical Documentation Physician Advisors





                                          EDSIRE Rodriguez RN, BSN RN [email or web address]                 

                                        [email or web address] 


                  Office [phone]        Cell [phone]                  

   Office [phone]        Cell [phone]

## 2020-05-23 LAB — VANCOMYCIN,TROUGH: 6.2 UG/ML (ref 5–20)

## 2020-05-23 RX ADMIN — INSULIN HUMAN SCH UNIT: 100 INJECTION, SOLUTION PARENTERAL at 11:45

## 2020-05-23 RX ADMIN — Medication SCH UNIT: at 06:22

## 2020-05-23 RX ADMIN — Medication SCH ML: at 06:22

## 2020-05-23 RX ADMIN — PIPERACILLIN AND TAZOBACTAM SCH MLS/HR: 3; .375 INJECTION, POWDER, LYOPHILIZED, FOR SOLUTION INTRAVENOUS; PARENTERAL at 01:00

## 2020-05-23 RX ADMIN — Medication SCH ML: at 15:39

## 2020-05-23 RX ADMIN — INSULIN HUMAN SCH: 100 INJECTION, SOLUTION PARENTERAL at 06:24

## 2020-05-23 RX ADMIN — ATENOLOL SCH MG: 50 TABLET ORAL at 15:38

## 2020-05-23 RX ADMIN — VANCOMYCIN HYDROCHLORIDE SCH MLS/HR: 1 INJECTION, POWDER, LYOPHILIZED, FOR SOLUTION INTRAVENOUS at 08:51

## 2020-05-23 RX ADMIN — MAGNESIUM OXIDE TAB 400 MG (241.3 MG ELEMENTAL MG) SCH: 400 (241.3 MG) TAB at 15:44

## 2020-05-23 RX ADMIN — DIVALPROEX SODIUM SCH MG: 500 TABLET, FILM COATED, EXTENDED RELEASE ORAL at 23:15

## 2020-05-23 RX ADMIN — Medication SCH UNIT: at 15:38

## 2020-05-23 RX ADMIN — ATENOLOL SCH MG: 50 TABLET ORAL at 17:44

## 2020-05-23 RX ADMIN — PANTOPRAZOLE SODIUM SCH MG: 40 INJECTION, POWDER, FOR SOLUTION INTRAVENOUS at 10:01

## 2020-05-23 RX ADMIN — Medication SCH UNIT: at 23:14

## 2020-05-23 RX ADMIN — INSULIN HUMAN SCH UNIT: 100 INJECTION, SOLUTION PARENTERAL at 17:44

## 2020-05-23 RX ADMIN — INSULIN HUMAN SCH: 100 INJECTION, SOLUTION PARENTERAL at 01:14

## 2020-05-23 RX ADMIN — ATORVASTATIN CALCIUM SCH MG: 20 TABLET, FILM COATED ORAL at 23:13

## 2020-05-23 RX ADMIN — INSULIN HUMAN SCH UNIT: 100 INJECTION, SOLUTION PARENTERAL at 23:19

## 2020-05-23 RX ADMIN — HEPARIN SODIUM SCH: 5000 INJECTION, SOLUTION INTRAVENOUS; SUBCUTANEOUS at 15:44

## 2020-05-23 RX ADMIN — CEFTRIAXONE SCH MLS/HR: 2 INJECTION, SOLUTION INTRAVENOUS at 15:38

## 2020-05-23 RX ADMIN — PIPERACILLIN AND TAZOBACTAM SCH MLS/HR: 3; .375 INJECTION, POWDER, LYOPHILIZED, FOR SOLUTION INTRAVENOUS; PARENTERAL at 06:20

## 2020-05-23 RX ADMIN — AMLODIPINE BESYLATE SCH MG: 5 TABLET ORAL at 23:13

## 2020-05-23 RX ADMIN — HEPARIN SODIUM SCH: 5000 INJECTION, SOLUTION INTRAVENOUS; SUBCUTANEOUS at 23:01

## 2020-05-23 RX ADMIN — HEPARIN SODIUM SCH: 5000 INJECTION, SOLUTION INTRAVENOUS; SUBCUTANEOUS at 06:14

## 2020-05-23 RX ADMIN — PIPERACILLIN AND TAZOBACTAM SCH MLS/HR: 3; .375 INJECTION, POWDER, LYOPHILIZED, FOR SOLUTION INTRAVENOUS; PARENTERAL at 11:45

## 2020-05-23 RX ADMIN — Medication SCH ML: at 23:15

## 2020-05-23 NOTE — PDOC PROGRESS REPORT
Subjective


Progress Note for:: 05/23/20


Subjective:: 





Received signout from intensivist today.


Briefly, patient was admitted initially for evaluation of encephalopathy with 

waxing and waning consciousness.  She had been noted to be hypotensive and 

febrile and subsequently suspected to have septic shock.  Taken to the ICU and 

started on antibiotics.  Urinalysis was positive urine culture growing E. coli. 

Patient suspect to have sepsis due to UTI.  Was briefly on pressors and fluids 

were administered.  Mental status improved.  Pressors later discontinued and 

patient was sent to the floor.





Currently, patient feels well.  She denies any abdominal pain at this time or 

any pain.  Denies any shortness of breath.  States that she has had a UTI 

before.  Denies chronic Hendricks.


Reason For Visit: 


SEPTIC SHOCK








Physical Exam


Vital Signs: 


                                        











Temp Pulse Resp BP Pulse Ox


 


 98.5 F   85   19   174/65 H  94 


 


 05/23/20 12:00  05/23/20 12:00  05/23/20 12:00  05/23/20 12:00  05/23/20 12:00








                                 Intake & Output











 05/22/20 05/23/20 05/24/20





 06:59 06:59 06:59


 


Intake Total 3360 2250 350


 


Output Total 3750 1000 


 


Balance -390 1250 350


 


Weight 98 kg 98 kg 98 kg











General appearance: PRESENT: no acute distress, cooperative


Neck exam: ABSENT: JVD


Respiratory exam: PRESENT: clear to auscultation joey, unlabored.  ABSENT: 

tachypnea, wheezes


Cardiovascular exam: PRESENT: RRR, +S1, +S2.  ABSENT: tachycardia


GI/Abdominal exam: PRESENT: soft.  ABSENT: rebound, rigid, tenderness


Neurological exam: PRESENT: alert, awake, oriented to person, oriented to place,

oriented to time





Results


Laboratory Results: 


                                        





                                 05/22/20 05:30 





                                 05/23/20 08:00 





                                        











  05/23/20





  08:00


 


Creatinine  0.92


 


Est GFR ( Amer)  > 60








                                        





05/21/20 06:20   Blood   Blood Culture - Final


                            Escherichia Coli





                                        











  05/21/20 05/21/20





  04:45 04:45


 


Creatine Kinase   755 H


 


Troponin I  < 0.012 











Impressions: 


                                        





Head CT  05/21/20 00:00


IMPRESSION: No acute intracranial abnormality. 


 








Cervical Spine CT  05/21/20 04:18


IMPRESSION: Degenerative changes with no acute abnormality.


 








Hip X-Ray  05/21/20 04:18


IMPRESSION: Degenerative changes in the hips with no acute


abnormality.


 








Knee X-Ray  05/21/20 04:18


IMPRESSION:


1. Changes of osteoarthritis in the right knee with no acute bony


abnormality in either knee.


2. Very small joint effusion in the right knee, if clinically


indicated follow up MRI could better evaluate for internal


derangement of the joint.


 








Chest X-Ray  05/21/20 07:42


IMPRESSION:  CENTRAL LINE IN SATISFACTORY POSITION.  NO PNEUMOTHORAX.


 














Assessment and Plan





- Diagnosis


(1) E coli bacteremia


Is this a current diagnosis for this admission?: Yes   


Plan: 


Secondary to urinary tract infection.  Has been on vancomycin and Zosyn.  Will 

change to ceftriaxone today.  Blood cultures repeated.








(2) Septic shock due to Escherichia coli


Is this a current diagnosis for this admission?: Yes   


Plan: 


Septic shock seems to have resolved.  Will discontinue IV fluids at this time.  

We will continue to monitor urine output.  Monitor CBC given thrombocytopenia 

possibly from sepsis.








(3) Acute metabolic encephalopathy


Is this a current diagnosis for this admission?: Yes   





(4) Type 2 diabetes mellitus with diabetic neuropathy


Qualifiers: 


   Diabetes mellitus long term insulin use: unspecified long term insulin use 

status   Qualified Code(s): E11.40 - Type 2 diabetes mellitus with diabetic 

neuropathy, unspecified   


Is this a current diagnosis for this admission?: Yes   


Plan: 


Patient states that she has been taking of all medications because of blood 

sugars that are running low.  Apparently she is currently diet-controlled.  Will

check hemoglobin A1c in the morning.  Continue sliding scale insulin as blood 

sugars mildly uncontrolled today.








(5) Urinary tract infection


Qualifiers: 


   Hematuria presence: without hematuria 


Is this a current diagnosis for this admission?: Yes   


Plan: 


Urine culture growing gram-negative juana which I suspect will correspond to the 

E. coli growing the blood cultures.  We will follow-up speciation and bacterial 

identification.  In the meantime continue with ceftriaxone as above.











(6) Hypertension, uncontrolled


Is this a current diagnosis for this admission?: Yes   


Plan: 


We will resume antihypertensives.  Will give a dose of amlodipine 5 mg now on 

atenolol.  Continue to monitor blood pressure response closely.








- Time


Time Spent with patient: 15-24 minutes

## 2020-05-24 LAB
ABSOLUTE LYMPHOCYTES# (MANUAL): 1.2 10^3/UL (ref 0.5–4.7)
ABSOLUTE MONOCYTES # (MANUAL): 0.5 10^3/UL (ref 0.1–1.4)
ADD MANUAL DIFF: YES
ANION GAP SERPL CALC-SCNC: 9 MMOL/L (ref 5–19)
BASOPHILS NFR BLD MANUAL: 0 % (ref 0–2)
BUN SERPL-MCNC: 16 MG/DL (ref 7–20)
CALCIUM: 9.1 MG/DL (ref 8.4–10.2)
CHLORIDE SERPL-SCNC: 103 MMOL/L (ref 98–107)
CO2 SERPL-SCNC: 24 MMOL/L (ref 22–30)
EOSINOPHIL NFR BLD MANUAL: 7 % (ref 0–6)
ERYTHROCYTE [DISTWIDTH] IN BLOOD BY AUTOMATED COUNT: 13.6 % (ref 11.5–14)
GLUCOSE SERPL-MCNC: 147 MG/DL (ref 75–110)
HCT VFR BLD CALC: 34.8 % (ref 36–47)
HGB BLD-MCNC: 12.1 G/DL (ref 12–15.5)
MCH RBC QN AUTO: 33.9 PG (ref 27–33.4)
MCHC RBC AUTO-ENTMCNC: 34.8 G/DL (ref 32–36)
MCV RBC AUTO: 97 FL (ref 80–97)
METAMYELOCYTES % (MANUAL): 1 % (ref 0–1)
MONOCYTES % (MANUAL): 8 % (ref 3–13)
PLATELET # BLD: 105 10^3/UL (ref 150–450)
PLATELET COMMENT: (no result)
POLYCHROMASIA BLD QL SMEAR: SLIGHT
POTASSIUM SERPL-SCNC: 3.8 MMOL/L (ref 3.6–5)
RBC # BLD AUTO: 3.57 10^6/UL (ref 3.72–5.28)
SEGMENTED NEUTROPHILS % (MAN): 66 % (ref 42–78)
TOTAL CELLS COUNTED BLD: 100
VARIANT LYMPHS NFR BLD MANUAL: 17 % (ref 13–45)
WBC # BLD AUTO: 6.4 10^3/UL (ref 4–10.5)

## 2020-05-24 RX ADMIN — INSULIN HUMAN SCH: 100 INJECTION, SOLUTION PARENTERAL at 17:26

## 2020-05-24 RX ADMIN — HEPARIN SODIUM SCH: 5000 INJECTION, SOLUTION INTRAVENOUS; SUBCUTANEOUS at 14:19

## 2020-05-24 RX ADMIN — ATENOLOL SCH MG: 50 TABLET ORAL at 17:30

## 2020-05-24 RX ADMIN — AMLODIPINE BESYLATE SCH MG: 5 TABLET ORAL at 10:25

## 2020-05-24 RX ADMIN — PANTOPRAZOLE SODIUM SCH MG: 40 INJECTION, POWDER, FOR SOLUTION INTRAVENOUS at 10:24

## 2020-05-24 RX ADMIN — INSULIN HUMAN SCH UNIT: 100 INJECTION, SOLUTION PARENTERAL at 11:47

## 2020-05-24 RX ADMIN — CEFTRIAXONE SCH MLS/HR: 2 INJECTION, SOLUTION INTRAVENOUS at 10:25

## 2020-05-24 RX ADMIN — ACETAMINOPHEN PRN MG: 325 TABLET ORAL at 19:48

## 2020-05-24 RX ADMIN — ATENOLOL SCH MG: 50 TABLET ORAL at 10:25

## 2020-05-24 RX ADMIN — Medication SCH UNIT: at 06:00

## 2020-05-24 RX ADMIN — LOSARTAN POTASSIUM SCH MG: 50 TABLET, FILM COATED ORAL at 10:27

## 2020-05-24 RX ADMIN — INSULIN HUMAN SCH: 100 INJECTION, SOLUTION PARENTERAL at 21:29

## 2020-05-24 RX ADMIN — Medication SCH: at 17:26

## 2020-05-24 RX ADMIN — DIVALPROEX SODIUM SCH MG: 500 TABLET, FILM COATED, EXTENDED RELEASE ORAL at 10:25

## 2020-05-24 RX ADMIN — INSULIN HUMAN SCH: 100 INJECTION, SOLUTION PARENTERAL at 08:57

## 2020-05-24 RX ADMIN — MAGNESIUM OXIDE TAB 400 MG (241.3 MG ELEMENTAL MG) SCH MG: 400 (241.3 MG) TAB at 10:25

## 2020-05-24 RX ADMIN — Medication SCH ML: at 05:59

## 2020-05-24 RX ADMIN — OXYCODONE AND ACETAMINOPHEN PRN TAB: 5; 325 TABLET ORAL at 03:18

## 2020-05-24 RX ADMIN — Medication SCH ML: at 21:32

## 2020-05-24 RX ADMIN — HEPARIN SODIUM SCH: 5000 INJECTION, SOLUTION INTRAVENOUS; SUBCUTANEOUS at 05:37

## 2020-05-24 RX ADMIN — HEPARIN SODIUM SCH: 5000 INJECTION, SOLUTION INTRAVENOUS; SUBCUTANEOUS at 21:29

## 2020-05-24 RX ADMIN — ATORVASTATIN CALCIUM SCH MG: 20 TABLET, FILM COATED ORAL at 21:28

## 2020-05-24 RX ADMIN — AMLODIPINE BESYLATE SCH MG: 5 TABLET ORAL at 21:27

## 2020-05-24 RX ADMIN — DIVALPROEX SODIUM SCH MG: 500 TABLET, FILM COATED, EXTENDED RELEASE ORAL at 21:28

## 2020-05-24 NOTE — PDOC PROGRESS REPORT
Subjective


Progress Note for:: 05/24/20


Subjective:: 





Patient feels well this morning.  Denies any shortness of breath chest pain 

abdominal pain.  Walked with physical therapy but did not do so well.


Reason For Visit: 


SEPTIC SHOCK








Physical Exam


Vital Signs: 


                                        











Temp Pulse Resp BP Pulse Ox


 


 98.6 F   73   18   181/85 H  93 


 


 05/24/20 07:22  05/24/20 07:22  05/24/20 07:22  05/24/20 07:22  05/24/20 07:22








                                 Intake & Output











 05/23/20 05/24/20 05/25/20





 06:59 06:59 06:59


 


Intake Total 2250 900 200


 


Output Total 1000 3125 200


 


Balance 1250 -2225 0


 


Weight 98 kg 99.2 kg 











General appearance: PRESENT: no acute distress, cooperative


Neck exam: ABSENT: JVD


Respiratory exam: PRESENT: symmetrical, unlabored.  ABSENT: tachypnea, wheezes


Cardiovascular exam: PRESENT: RRR, +S1, +S2.  ABSENT: tachycardia


GI/Abdominal exam: PRESENT: soft.  ABSENT: distended, firm, guarding, rebound, 

rigid, tenderness


Neurological exam: PRESENT: alert, awake, oriented to person, oriented to place,

oriented to time, oriented to situation





Results


Laboratory Results: 


                                        





                                 05/24/20 06:10 





                                 05/24/20 06:10 





                                        











  05/24/20 05/24/20





  06:10 06:10


 


WBC   6.4


 


RBC   3.57 L


 


Hgb   12.1


 


Hct   34.8 L


 


MCV   97


 


MCH   33.9 H


 


MCHC   34.8


 


RDW   13.6


 


Plt Count   105 L


 


Seg Neutrophils %   Not Reportable


 


Sodium  136.0 L 


 


Potassium  3.8 


 


Chloride  103 


 


Carbon Dioxide  24 


 


Anion Gap  9 


 


BUN  16 


 


Creatinine  0.70 


 


Est GFR (African Amer)  > 60 


 


Glucose  147 H 


 


Calcium  9.1 








                                        





05/21/20 05:35   Blood   Blood Culture - Final


                            Staphylococcus Epidermidis


05/21/20 06:00   Catheterized Urine   Urine Culture - Final


                            Escherichia Coli


05/21/20 06:20   Blood   Blood Culture - Final


                            Escherichia Coli





                                        











  05/21/20 05/21/20





  04:45 04:45


 


Creatine Kinase   755 H


 


Troponin I  < 0.012 











Impressions: 


                                        





Head CT  05/21/20 00:00


IMPRESSION: No acute intracranial abnormality. 


 








Cervical Spine CT  05/21/20 04:18


IMPRESSION: Degenerative changes with no acute abnormality.


 








Hip X-Ray  05/21/20 04:18


IMPRESSION: Degenerative changes in the hips with no acute


abnormality.


 








Knee X-Ray  05/21/20 04:18


IMPRESSION:


1. Changes of osteoarthritis in the right knee with no acute bony


abnormality in either knee.


2. Very small joint effusion in the right knee, if clinically


indicated follow up MRI could better evaluate for internal


derangement of the joint.


 








Chest X-Ray  05/21/20 07:42


IMPRESSION:  CENTRAL LINE IN SATISFACTORY POSITION.  NO PNEUMOTHORAX.


 














Assessment and Plan





- Diagnosis


(1) E coli bacteremia


Is this a current diagnosis for this admission?: Yes   


Plan: 


Secondary to urinary tract infection.  Continue with ceftriaxone.  Day 3/10 of 

antibiotics.  Plan to discharge with oral medication.








(2) Hypertension, uncontrolled


Is this a current diagnosis for this admission?: Yes   


Plan: 


Resumed atenolol.  Resume to amlodipine yesterday and increased to 5 mg twice a 

day.  Added losartan 50 mg daily.  May need to increase losartan dose as patient

currently is having hypertensive urgency with SBP of 180.  IV labetalol as 

needed if oral medications do not show good effect.








(3) Septic shock due to Escherichia coli


Is this a current diagnosis for this admission?: Yes   


Plan: 


Resolved at this time








(4) Acute metabolic encephalopathy


Is this a current diagnosis for this admission?: Yes   


Plan: 


Improved at this time.  Was thought to have been secondary to sepsis.








(5) Type 2 diabetes mellitus with diabetic neuropathy


Qualifiers: 


   Diabetes mellitus long term insulin use: unspecified long term insulin use 

status   Qualified Code(s): E11.40 - Type 2 diabetes mellitus with diabetic 

neuropathy, unspecified   


Is this a current diagnosis for this admission?: Yes   


Plan: 


Hemoglobin A1c is 7.1.  I will restart the patient on glimepiride which she was 

taking in the recent past given hyperglycemia during hospitalization.  Continue 

sliding scale insulin as blood sugars mildly uncontrolled today.








(6) Urinary tract infection


Qualifiers: 


   Hematuria presence: without hematuria 


Is this a current diagnosis for this admission?: Yes   


Plan: 


Urine culture growing E. coli.  Continue ceftriaxone.











(7) Physical deconditioning


Is this a current diagnosis for this admission?: Yes   


Plan: 


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  Walked with physical therapist

today but patient will require another day of physical therapy to see if she can

qualify for home health.  Otherwise may need to go to SNF for short-term 

rehabilitation.  Will reassess tomorrow.








- Time


Time Spent with patient: Less than 15 minutes

## 2020-05-25 VITALS — DIASTOLIC BLOOD PRESSURE: 67 MMHG | SYSTOLIC BLOOD PRESSURE: 144 MMHG

## 2020-05-25 RX ADMIN — HEPARIN SODIUM SCH UNIT: 5000 INJECTION, SOLUTION INTRAVENOUS; SUBCUTANEOUS at 13:39

## 2020-05-25 RX ADMIN — ATENOLOL SCH MG: 50 TABLET ORAL at 09:06

## 2020-05-25 RX ADMIN — PANTOPRAZOLE SODIUM SCH MG: 40 INJECTION, POWDER, FOR SOLUTION INTRAVENOUS at 09:06

## 2020-05-25 RX ADMIN — Medication SCH: at 13:37

## 2020-05-25 RX ADMIN — ACETAMINOPHEN PRN MG: 325 TABLET ORAL at 09:44

## 2020-05-25 RX ADMIN — HEPARIN SODIUM SCH: 5000 INJECTION, SOLUTION INTRAVENOUS; SUBCUTANEOUS at 13:43

## 2020-05-25 RX ADMIN — DIVALPROEX SODIUM SCH MG: 500 TABLET, FILM COATED, EXTENDED RELEASE ORAL at 09:05

## 2020-05-25 RX ADMIN — OXYCODONE AND ACETAMINOPHEN PRN TAB: 5; 325 TABLET ORAL at 02:52

## 2020-05-25 RX ADMIN — AMLODIPINE BESYLATE SCH MG: 5 TABLET ORAL at 09:06

## 2020-05-25 RX ADMIN — Medication SCH: at 05:43

## 2020-05-25 RX ADMIN — HEPARIN SODIUM SCH: 5000 INJECTION, SOLUTION INTRAVENOUS; SUBCUTANEOUS at 05:42

## 2020-05-25 RX ADMIN — MAGNESIUM OXIDE TAB 400 MG (241.3 MG ELEMENTAL MG) SCH MG: 400 (241.3 MG) TAB at 09:05

## 2020-05-25 RX ADMIN — INSULIN HUMAN SCH: 100 INJECTION, SOLUTION PARENTERAL at 12:41

## 2020-05-25 RX ADMIN — CEFTRIAXONE SCH MLS/HR: 2 INJECTION, SOLUTION INTRAVENOUS at 09:05

## 2020-05-25 RX ADMIN — INSULIN HUMAN SCH: 100 INJECTION, SOLUTION PARENTERAL at 08:41

## 2020-05-25 RX ADMIN — OXYCODONE AND ACETAMINOPHEN PRN TAB: 5; 325 TABLET ORAL at 11:27

## 2020-05-25 RX ADMIN — LOSARTAN POTASSIUM SCH MG: 50 TABLET, FILM COATED ORAL at 09:05

## 2020-05-25 NOTE — PDOC DISCHARGE SUMMARY
Impression





- Admit/DC Date/PCP


Admission Date/Primary Care Provider: 


  05/21/20 08:05





  KAT VAZQUEZ MD





Discharge Date: 05/25/20





- Discharge Diagnosis


(1) E coli bacteremia


Is this a current diagnosis for this admission?: Yes   





(2) Hypertension, uncontrolled


Is this a current diagnosis for this admission?: Yes   





(3) Septic shock due to Escherichia coli


Is this a current diagnosis for this admission?: Yes   





(4) Acute metabolic encephalopathy


Is this a current diagnosis for this admission?: Yes   





(5) Type 2 diabetes mellitus with diabetic neuropathy


Is this a current diagnosis for this admission?: Yes   





(6) Urinary tract infection


Is this a current diagnosis for this admission?: Yes   





(7) Physical deconditioning


Is this a current diagnosis for this admission?: Yes   





- Additional Information


Resuscitation Status: Full Code


Discharge Diet: Diabetic


Referrals: 


KAT VAZQUEZ MD [Primary Care Provider] - 


Prescriptions: 


Glimepiride [Amaryl 1 mg Tablet] 2 mg PO QAM #30 tablet


Losartan Potassium [Cozaar 50 mg Tablet] 50 mg PO DAILY #30 tablet


Cephalexin Monohydrate [Keflex 500 mg Capsule] 500 mg PO TID 6 Days #18 cap


Amlodipine Besylate [Norvasc 5 mg Tablet] 5 mg PO Q12 30 Days  tablet


Home Medications: 








Albuterol Sulfate [Proair HFA Inhalation Aerosol 8.5 gm MDI] 2 puff IH Q4HP PRN 

05/21/20 


Atenolol [Tenormin 50 mg Tablet] 50 mg PO BID 05/21/20 


Atorvastatin Calcium [Lipitor 20 mg Tablet] 20 mg PO QHS 05/21/20 


Divalproex Sodium [Depakote  mg Tab.sr] 500 mg PO Q12 05/21/20 


Gabapentin [Neurontin] 600 mg PO TID 05/21/20 


Magnesium Oxide [Mag-Ox 400 mg Tablet] 400 mg PO DAILY 05/21/20 


Meloxicam [Mobic] 15 mg PO DAILY 05/21/20 


Oxycodone HCl/Acetaminophen [Percocet 5-325 mg Tablet] 1 tab PO Q8HP PRN 

05/21/20 


Solifenacin Succinate 10 mg PO DAILY 05/21/20 


Amlodipine Besylate [Norvasc 5 mg Tablet] 5 mg PO Q12 30 Days  tablet 05/25/20 


Cephalexin Monohydrate [Keflex 500 mg Capsule] 500 mg PO TID 6 Days #18 cap 

05/25/20 


Glimepiride [Amaryl 1 mg Tablet] 2 mg PO QAM #30 tablet 05/25/20 


Losartan Potassium [Cozaar 50 mg Tablet] 50 mg PO DAILY #30 tablet 05/25/20 











History of Present Illiness


History of Present Illness: 


According to admitting provider: LEONEL ABARCA is a 65-year-old obese 

 female presented [facility] emergency department with a 

chief complaint of altered mental status, waxing and waning mentation.  The 

patient's  reported that he woke up this morning to find the patient 

lying on the floor and called EMS.  EMS reported that her initial stroke screen 

was negative and even demonstrated improvement in mentation with normal 

conversation; however, she rapidly became confused, sedated and started slurring

her speech and even appeared to have episodes of apnea.  Bag mask ventilatory 

support was provided along with placement of a nasopharyngeal airway.  En route,

she demonstrated spontaneous respirations and no longer required ventilatory 

support.  In the emergency department, the patient again demonstrated waxing and

waning mental status.  She was confused and thought she was at [facility].

 While in the emergency department, she did demonstrate hypotension, prompting 

initiation of norepinephrine infusion.  Initial imaging and laboratory testing 

failed to reveal an obvious etiology.





At the time of clinical interview, the patient is quite somnolent.  She is 

arousable to noxious stimuli.  It is noteworthy that her level of orientation 

seems to rapidly wax and wane during the course of a single conversation.  At 

times, she is oriented to time and person and is even able to provide quite a 

bit of her own medical history.  Then, she deteriorates to becoming difficult to

arouse.  She does protect her airway.  Notably, her urine drug screen was 

negative.








Hospital Course


Hospital Course: 


Briefly, patient was admitted initially for evaluation of acute metabolic 

encephalopathy with waxing and waning consciousness.  She had been noted to be 

hypotensive and febrile and subsequently suspected to have septic shock.  Taken 

to the ICU and started on antibiotics.  Urinalysis was positive and urine 

culture growing E. coli.  Patient suspect to have sepsis due to UTI.  Was 

briefly on pressors and fluids were administered.  Mental status improved.  

Pressors later discontinued and patient was sent to the floor.  On the floor, 

patient has been doing well.  She has been completely awake and alert and fully 

oriented.  With the past couple of days, metabolic encephalopathy seems to have 

resolved.  Repeat blood cultures show clearance of the E. coli bacteremia.  

Patient was on ceftriaxone and is being discharged on Keflex which the bacteria 

is susceptible to based of report to complete 10 days of treatment.  Her blood 

pressure regimen was also adjusted due to uncontrolled hypertension and she was 

restarted on glimepiride for her diabetes.  Patient was evaluated by physical 

therapy and has been set up for home health.





Physical Exam


Vital Signs: 


                                        











Temp Pulse Resp BP Pulse Ox


 


 97.7 F   78   12   134/79 H  99 


 


 05/25/20 08:00  05/25/20 08:00  05/25/20 08:00  05/25/20 08:00  05/25/20 08:00








                                 Intake & Output











 05/24/20 05/25/20 05/26/20





 06:59 06:59 06:59


 


Intake Total 900 732 486


 


Output Total 3125 200 


 


Balance -2225 532 486


 


Weight 99.2 kg 97.4 kg 











General appearance: PRESENT: no acute distress, cooperative


Neck exam: ABSENT: JVD


Respiratory exam: PRESENT: clear to auscultation joey, unlabored


GI/Abdominal exam: PRESENT: soft.  ABSENT: tenderness


Neurological exam: PRESENT: alert, awake, oriented to person, oriented to place,

oriented to time, oriented to situation





Results


Laboratory Results: 


                                        











WBC  6.4 10^3/uL (4.0-10.5)   05/24/20  06:10    


 


RBC  3.57 10^6/uL (3.72-5.28)  L  05/24/20  06:10    


 


Hgb  12.1 g/dL (12.0-15.5)   05/24/20  06:10    


 


Hct  34.8 % (36.0-47.0)  L  05/24/20  06:10    


 


MCV  97 fl (80-97)   05/24/20  06:10    


 


MCH  33.9 pg (27.0-33.4)  H  05/24/20  06:10    


 


MCHC  34.8 g/dL (32.0-36.0)   05/24/20  06:10    


 


RDW  13.6 % (11.5-14.0)   05/24/20  06:10    


 


Plt Count  105 10^3/uL (150-450)  L  05/24/20  06:10    


 


Lymph % (Auto)  Not Reportable   05/24/20  06:10    


 


Mono % (Auto)  Not Reportable   05/24/20  06:10    


 


Eos % (Auto)  Not Reportable   05/24/20  06:10    


 


Baso % (Auto)  Not Reportable   05/24/20  06:10    


 


Absolute Neuts (auto)  Not Reportable   05/24/20  06:10    


 


Absolute Lymphs (auto)  Not Reportable   05/24/20  06:10    


 


Absolute Monos (auto)  Not Reportable   05/24/20  06:10    


 


Absolute Eos (auto)  Not Reportable   05/24/20  06:10    


 


Absolute Basos (auto)  Not Reportable   05/24/20  06:10    


 


Total Counted  100   05/24/20  06:10    


 


Seg Neutrophils %  Not Reportable   05/24/20  06:10    


 


Seg Neuts % (Manual)  66 % (42-78)   05/24/20  06:10    


 


Band Neutrophils %  5 % (3-5)   05/21/20  04:45    


 


Lymphocytes % (Manual)  17 % (13-45)   05/24/20  06:10    


 


Atypical Lymphs %  1 % (0)   05/24/20  06:10    


 


Monocytes % (Manual)  8 % (3-13)   05/24/20  06:10    


 


Eosinophils % (Manual)  7 % (0-6)  H  05/24/20  06:10    


 


Basophils % (Manual)  0 % (0-2)   05/24/20  06:10    


 


Metamyelocytes %  1 % (0-1)   05/24/20  06:10    


 


Abs Neuts (Manual)  4.3 10^3/uL (1.7-8.2)   05/24/20  06:10    


 


Abs Lymphs (Manual)  1.2 10^3/uL (0.5-4.7)   05/24/20  06:10    


 


Abs Monocytes (Manual)  0.5 10^3/uL (0.1-1.4)   05/24/20  06:10    


 


Absolute Eos (Manual)  0.4 10^3/uL (0.0-0.6)   05/24/20  06:10    


 


Abs Basophils (Manual)  0.0 10^3/uL (0.0-0.2)   05/24/20  06:10    


 


Platelet Comment  DECREASED   05/24/20  06:10    


 


Polychromasia  SLIGHT   05/24/20  06:10    


 


Macrocytosis  1+   05/21/20  04:45    


 


Carbonic Acid  1.14 mmol/L (1.05-1.35)   05/21/20  05:17    


 


HCO3/H2CO3 Ratio  18:1   05/21/20  05:17    


 


ABG pH  7.36  (7.35-7.45)   05/21/20  05:17    


 


ABG pCO2  37.9 mmHg (35-45)   05/21/20  05:17    


 


ABG pO2  64.3 mmHg ()  L  05/21/20  05:17    


 


ABG HCO3  20.7 mmol/L (20-24)   05/21/20  05:17    


 


ABG Total CO2  21.9 mmol/L (21-25)   05/21/20  05:17    


 


ABG O2 Saturation  91.8 % (94-98)  L  05/21/20  05:17    


 


ABG Base Excess  -4.3 mmol/L  05/21/20  05:17    


 


FiO2  ROOM AIR   05/21/20  05:17    


 


Sodium  136.0 mmol/L (137-145)  L  05/24/20  06:10    


 


Potassium  3.8 mmol/L (3.6-5.0)   05/24/20  06:10    


 


Chloride  103 mmol/L ()   05/24/20  06:10    


 


Carbon Dioxide  24 mmol/L (22-30)   05/24/20  06:10    


 


Anion Gap  9  (5-19)   05/24/20  06:10    


 


BUN  16 mg/dL (7-20)   05/24/20  06:10    


 


Creatinine  0.70 mg/dL (0.52-1.25)   05/24/20  06:10    


 


Est GFR ( Amer)  > 60  (>60)   05/24/20  06:10    


 


Est GFR (MDRD) Non-Af  > 60  (>60)   05/24/20  06:10    


 


Glucose  147 mg/dL ()  H  05/24/20  06:10    


 


POC Glucose  174 mg/dL ()  H  05/25/20  10:49    


 


Hemoglobin A1c %  7.1 % (4.7-6.0)  H  05/24/20  06:10    


 


Lactic Acid  0.6 mmol/L (0.7-2.1)  L  05/21/20  17:49    


 


Calcium  9.1 mg/dL (8.4-10.2)   05/24/20  06:10    


 


Phosphorus  2.7 mg/dL (2.5-4.5)   05/22/20  05:30    


 


Magnesium  1.7 mg/dL (1.6-2.3)   05/22/20  05:30    


 


Total Bilirubin  0.8 mg/dL (0.2-1.3)   05/21/20  04:45    


 


Direct Bilirubin  0.2 mg/dL (0.0-0.4)   05/21/20  04:45    


 


Neonat Total Bilirubin  Not Reportable   05/21/20  04:45    


 


Neonat Direct Bilirubin  Not Reportable   05/21/20  04:45    


 


Neonat Indirect Bili  Not Reportable   05/21/20  04:45    


 


AST  149 U/L (14-36)  H  05/21/20  04:45    


 


ALT  101 U/L (<35)  H  05/21/20  04:45    


 


Alkaline Phosphatase  109 U/L ()   05/21/20  04:45    


 


Ammonia  11.7 umol/L (9-33)   05/21/20  10:00    


 


Creatine Kinase  755 U/L ()  H  05/21/20  04:45    


 


Troponin I  < 0.012 ng/mL  05/21/20  04:45    


 


Total Protein  6.4 g/dL (6.3-8.2)   05/21/20  04:45    


 


Albumin  4.0 g/dL (3.5-5.0)   05/21/20  04:45    


 


Prealbumin  19.5 mg/dL (17.6-36.0)   05/22/20  05:30    


 


Urine Color  YELLOW   05/21/20  06:00    


 


Urine Appearance  CLOUDY   05/21/20  06:00    


 


Urine pH  5.0  (5.0-9.0)   05/21/20  06:00    


 


Ur Specific Gravity  1.018   05/21/20  06:00    


 


Urine Protein  30 mg/dL (NEGATIVE)  H  05/21/20  06:00    


 


Urine Glucose (UA)  NEGATIVE mg/dL (NEGATIVE)   05/21/20  06:00    


 


Urine Ketones  NEGATIVE mg/dL (NEGATIVE)   05/21/20  06:00    


 


Urine Blood  NEGATIVE  (NEGATIVE)   05/21/20  06:00    


 


Urine Nitrite  POSITIVE  (NEGATIVE)  H  05/21/20  06:00    


 


Urine Bilirubin  NEGATIVE  (NEGATIVE)   05/21/20  06:00    


 


Urine Urobilinogen  NEGATIVE mg/dL (<2.0)   05/21/20  06:00    


 


Ur Leukocyte Esterase  SMALL  (NEGATIVE)  H  05/21/20  06:00    


 


Urine WBC (Auto)  7 /HPF  05/21/20  06:00    


 


Urine RBC (Auto)  2 /HPF  05/21/20  06:00    


 


Urine Bacteria (Auto)  3+ /HPF  05/21/20  06:00    


 


Squamous Epi Cells Auto  8 /HPF  05/21/20  06:00    


 


Urine Mucus (Auto)  MOD /LPF  05/21/20  06:00    


 


Urine Ascorbic Acid  40  (NEGATIVE)  H  05/21/20  06:00    


 


Time Trough Drawn  0800   05/23/20  08:00    


 


Vancomycin Trough  6.2 ug/mL (5.0-20.0)   05/23/20  08:00    


 


Urine Opiates Screen  NEGATIVE   05/21/20  06:00    


 


Urine Methadone Screen  NEGATIVE   05/21/20  06:00    


 


Ur Barbiturates Screen  NEGATIVE   05/21/20  06:00    


 


Valproic Acid  62.5 ug/mL (50.0-120.0)   05/21/20  04:45    


 


Ur Phencyclidine Scrn  NEGATIVE   05/21/20  06:00    


 


Ur Amphetamines Screen  NEGATIVE   05/21/20  06:00    


 


U Benzodiazepines Scrn  NEGATIVE   05/21/20  06:00    


 


Urine Cocaine Screen  NEGATIVE   05/21/20  06:00    


 


U Marijuana (THC) Screen  NEGATIVE   05/21/20  06:00    


 


Serum Alcohol  < 10 mg/dL (NONE DETECTED)   05/21/20  04:45    


 


COVID-19 Source  Cancelled   05/21/20  08:45    


 


COVID-19 (LYDIA)  Cancelled   05/21/20  08:45    


 


SARS-CoV-2 (PCR)  NEGATIVE  (NEGATIVE)   05/21/20  08:45    








                                        











  05/21/20





  04:45


 


Troponin I  < 0.012











Impressions: 


                                        





Head CT  05/21/20 00:00


IMPRESSION: No acute intracranial abnormality. 


 








Cervical Spine CT  05/21/20 04:18


IMPRESSION: Degenerative changes with no acute abnormality.


 








Chest X-Ray  05/21/20 04:18


IMPRESSION: No acute disease.


 








Hip X-Ray  05/21/20 04:18


IMPRESSION: Degenerative changes in the hips with no acute


abnormality.


 








Knee X-Ray  05/21/20 04:18


IMPRESSION:


1. Changes of osteoarthritis in the right knee with no acute bony


abnormality in either knee.


2. Very small joint effusion in the right knee, if clinically


indicated follow up MRI could better evaluate for internal


derangement of the joint.


 








Chest X-Ray  05/21/20 07:42


IMPRESSION:  CENTRAL LINE IN SATISFACTORY POSITION.  NO PNEUMOTHORAX.


 














Plan


Time Spent: Less than 30 Minutes





Stroke


Is this a Stroke Patient?: No





Acute Heart Failure





- **


Is this a Heart Failure Patient?: No